# Patient Record
Sex: FEMALE | Race: BLACK OR AFRICAN AMERICAN | Employment: FULL TIME | URBAN - METROPOLITAN AREA
[De-identification: names, ages, dates, MRNs, and addresses within clinical notes are randomized per-mention and may not be internally consistent; named-entity substitution may affect disease eponyms.]

---

## 2019-02-26 ENCOUNTER — HOSPITAL ENCOUNTER (EMERGENCY)
Age: 59
Discharge: HOME | End: 2019-02-26
Attending: EMERGENCY MEDICINE
Payer: COMMERCIAL

## 2019-02-26 VITALS
TEMPERATURE: 97.5 F | RESPIRATION RATE: 18 BRPM | WEIGHT: 196.87 LBS | HEART RATE: 76 BPM | DIASTOLIC BLOOD PRESSURE: 81 MMHG | HEIGHT: 62 IN | SYSTOLIC BLOOD PRESSURE: 130 MMHG | OXYGEN SATURATION: 96 % | BODY MASS INDEX: 36.23 KG/M2

## 2019-02-26 DIAGNOSIS — R21 RASH OF FACE: Primary | ICD-10-CM

## 2019-02-26 PROCEDURE — 74011636637 HC RX REV CODE- 636/637: Performed by: EMERGENCY MEDICINE

## 2019-02-26 PROCEDURE — 99283 EMERGENCY DEPT VISIT LOW MDM: CPT

## 2019-02-26 RX ORDER — PREDNISONE 20 MG/1
60 TABLET ORAL
Status: COMPLETED | OUTPATIENT
Start: 2019-02-26 | End: 2019-02-26

## 2019-02-26 RX ORDER — IBUPROFEN 200 MG
200 TABLET ORAL AS NEEDED
COMMUNITY

## 2019-02-26 RX ORDER — ESTRADIOL 1 MG/1
1 TABLET ORAL DAILY
COMMUNITY

## 2019-02-26 RX ORDER — METHYLPREDNISOLONE 4 MG/1
TABLET ORAL
Qty: 1 DOSE PACK | Refills: 0 | Status: SHIPPED | OUTPATIENT
Start: 2019-02-26

## 2019-02-26 RX ADMIN — PREDNISONE 60 MG: 20 TABLET ORAL at 17:16

## 2019-02-26 NOTE — DISCHARGE INSTRUCTIONS
Patient Education        Rash: Care Instructions  Your Care Instructions  A rash is any irritation or inflammation of the skin. Rashes have many possible causes, including allergy, infection, illness, heat, and emotional stress. Follow-up care is a key part of your treatment and safety. Be sure to make and go to all appointments, and call your doctor if you are having problems. It's also a good idea to know your test results and keep a list of the medicines you take. How can you care for yourself at home? · Wash the area with water only. Soap can make dryness and itching worse. Pat dry. · Put cold, wet cloths on the rash to reduce itching. · Keep cool, and stay out of the sun. · Leave the rash open to the air as much of the time as possible. · Sometimes petroleum jelly (Vaseline) can help relieve the discomfort caused by a rash. A moisturizing lotion, such as Cetaphil, also may help. Calamine lotion may help for rashes caused by contact with something (such as a plant or soap) that irritated the skin. Use it 3 or 4 times a day. · If your doctor prescribed a cream, use it as directed. If your doctor prescribed medicine, take it exactly as directed. · If your rash itches so badly that it interferes with your normal activities, take an over-the-counter antihistamine, such as diphenhydramine (Benadryl) or loratadine (Claritin). Read and follow all instructions on the label. When should you call for help? Call your doctor now or seek immediate medical care if:    · You have signs of infection, such as:  ? Increased pain, swelling, warmth, or redness. ? Red streaks leading from the area. ? Pus draining from the area. ? A fever.     · You have joint pain along with the rash.    Watch closely for changes in your health, and be sure to contact your doctor if:    · Your rash is changing or getting worse.  For example, call if you have pain along with the rash, the rash is spreading, or you have new blisters.     · You do not get better after 1 week. Where can you learn more? Go to http://sadaf-lee.info/. Enter F336 in the search box to learn more about \"Rash: Care Instructions. \"  Current as of: April 17, 2018  Content Version: 11.9  © 9146-8525 [employer]. Care instructions adapted under license by [employer] (which disclaims liability or warranty for this information). If you have questions about a medical condition or this instruction, always ask your healthcare professional. [address] any warranty or liability for your use of this information.

## 2019-02-26 NOTE — ED TRIAGE NOTES
Triage Note: Patient c/o rash on face that began around 1500 today. Reports itching and burning of the face. No respiratory symptoms noted. SpO2 96% room air.

## 2019-02-26 NOTE — ED PROVIDER NOTES
Pt. Presents to the ER with complaints of rash to her face. Her rash began approximately two hours ago and is located on both of her cheeks. Pt. Says that the redness is slightly less than it was earlier. No fevers/chills. Pt. Denies similar rashes in the past.  No SOB, lightheadedness, dizziness, n/v/d. No new foods. Pt. Does not wear make-up. No new soaps or anything else on her face. Past Medical History:  
Diagnosis Date  Diabetes ([address] borderline  Fibromyalgia Past Surgical History:  
Procedure Laterality Date  HX HYSTERECTOMY    
 total  
 HX TONSILLECTOMY  HX TUBAL LIGATION History reviewed. No pertinent family history. Social History Socioeconomic History  Marital status: Not on file Spouse name: Not on file  Number of children: Not on file  Years of education: Not on file  Highest education level: Not on file Social Needs  Financial resource strain: Not on file  Food insecurity - worry: Not on file  Food insecurity - inability: Not on file  Transportation needs - medical: Not on file  Transportation needs - non-medical: Not on file Occupational History  Not on file Tobacco Use  Smoking status: Never Smoker  Smokeless tobacco: Never Used Substance and Sexual Activity  Alcohol use: Yes Comment: socially  Drug use: No  
 Sexual activity: Not on file Other Topics Concern  Not on file Social History Narrative  Not on file ALLERGIES: Patient has no known allergies. Review of Systems Constitutional: Negative for chills and fever. HENT: Negative for rhinorrhea and sore throat. Respiratory: Negative for cough and shortness of breath. Cardiovascular: Negative for chest pain. Gastrointestinal: Negative for abdominal pain, diarrhea, nausea and vomiting. Genitourinary: Negative for dysuria and urgency. Musculoskeletal: Negative for arthralgias and back pain. Skin: Positive for rash. Neurological: Negative for dizziness, weakness and light-headedness. Vitals:  
 02/26/19 1703 BP: 132/73 Pulse: 91  
Resp: 20 Temp: 98.1 °F (36.7 °C) SpO2: 96% Weight: 89.3 kg (196 lb 13.9 oz) Height: 5' 2\" (1.575 m) Physical Exam  
 
Vital signs reviewed. Nursing notes reviewed. Const:  No acute distress, well developed, well nourished Head:  Atraumatic, normocephalic Eyes:  PERRL, conjunctiva normal, no scleral icterus Neck:  Supple, trachea midline Cardiovascular:  RRR, no murmurs, no gallops, no rubs Resp:  No resp distress, no increased work of breathing, no wheezes, no rhonchi, no rales, Abd:  Soft, non-tender, non-distended, no rebound, no guarding, no CVA tenderness :  Deferred MSK:  No pedal edema, normal ROM Neuro:  Alert and oriented x3, no cranial nerve defect Skin:  Urticaria on both cheeks, no other rash noted Psych: normal mood and affect, behavior is normal, judgement and thought content is normal 
 
 
 
 
MDM Number of Diagnoses or Management Options Rash of face:  
  
Amount and/or Complexity of Data Reviewed Review and summarize past medical records: yes Pt. Presents to the ER with complaints of rash to her face, likely allergic. Pt. Is well appearing in the ER. No signs of anaphylaxis. I will start her on a medrol dosepak. Pt. To f/u with her PCP or return to the ER with worsening sx. Procedures